# Patient Record
Sex: FEMALE | Race: BLACK OR AFRICAN AMERICAN
[De-identification: names, ages, dates, MRNs, and addresses within clinical notes are randomized per-mention and may not be internally consistent; named-entity substitution may affect disease eponyms.]

---

## 2021-03-01 ENCOUNTER — HOSPITAL ENCOUNTER (EMERGENCY)
Dept: HOSPITAL 47 - EC | Age: 21
Discharge: HOME | End: 2021-03-01
Payer: COMMERCIAL

## 2021-03-01 VITALS — HEART RATE: 64 BPM | RESPIRATION RATE: 18 BRPM | TEMPERATURE: 97.6 F

## 2021-03-01 DIAGNOSIS — O21.9: Primary | ICD-10-CM

## 2021-03-01 DIAGNOSIS — Z3A.00: ICD-10-CM

## 2021-03-01 LAB
PH UR: 7 [PH] (ref 5–8)
RBC UR QL: 2 /HPF (ref 0–5)
SP GR UR: 1.02 (ref 1–1.03)
SQUAMOUS UR QL AUTO: 9 /HPF (ref 0–4)
UROBILINOGEN UR QL STRIP: 6 MG/DL (ref ?–2)
WBC # UR AUTO: 3 /HPF (ref 0–5)

## 2021-03-01 PROCEDURE — 81001 URINALYSIS AUTO W/SCOPE: CPT

## 2021-03-01 PROCEDURE — 81025 URINE PREGNANCY TEST: CPT

## 2021-03-01 NOTE — ED
Recheck HPI





- General


Chief Complaint: Recheck/Abnormal Lab/Rx


Stated Complaint: Date check


Time Seen by Provider: 21 20:23


Source: patient


Mode of arrival: ambulatory


Limitations: no limitations





- History of Present Illness


Initial Comments: 


20-year-old female patient presents to the emergency department today requesting

confirmation of pregnancy.  Patient states that she took two pregnancy tests at 

home yesterday and wants to confirm that she is pregnant. Patient states that 

her last period was at the end of January. States it was a normal period. States

she has been having some nausea and did vomit some bile. She is A2 with 2 

elective abortions. She is requesting testing because her and her fiance have 

been together for 5 years and she never had a pregnancy with him. She is not on 

birth control and they do not use protection.  She denies any current abdominal 

pain, abnormal vaginal bleeding, or abnormal discharge.  Denies any genital 

itching.  Denies concern for sexually transmitted infections.  She denies any 

hematuria, dysuria, urinary frequency, urinary urgency.








- Related Data


                                  Previous Rx's











 Medication  Instructions  Recorded


 


Pnv No.95/Ferrous Fum/Folic AC 1 each PO DAILY #30 tablet 21





[Prenatal Multivitamin Tablet]  











                                    Allergies











Allergy/AdvReac Type Severity Reaction Status Date / Time


 


No Known Allergies Allergy   Verified 21 20:19














Review of Systems


ROS Statement: 


Those systems with pertinent positive or pertinent negative responses have been 

documented in the HPI.





ROS Other: All systems not noted in ROS Statement are negative.





Past Medical History


Past Medical History: No Reported History


History of Any Multi-Drug Resistant Organisms: None Reported


Past Surgical History: No Surgical Hx Reported


Past Psychological History: No Psychological Hx Reported


Smoking Status: Never smoker


Past Alcohol Use History: None Reported


Past Drug Use History: None Reported





General Exam


Limitations: no limitations


General appearance: alert, in no apparent distress, other (This is a well-

developed, well-nourished adult female patient in no acute distress.  Vital 

signs upon presentation are temperature 97.6F, pulse 64, respirations 18, pulse

ox 99% on room air.)


Respiratory exam: Present: normal lung sounds bilaterally.  Absent: respiratory 

distress, wheezes, rales, rhonchi, stridor


Cardiovascular Exam: Present: regular rate, normal rhythm, normal heart sounds. 

Absent: systolic murmur, diastolic murmur, rubs, gallop, clicks


GI/Abdominal exam: Present: soft, normal bowel sounds.  Absent: distended, 

tenderness, guarding, rebound, rigid


Neurological exam: Present: alert, oriented X3, CN II-XII intact


Psychiatric exam: Present: normal affect, normal mood


Skin exam: Present: warm, dry, intact, normal color.  Absent: rash





Course


                                   Vital Signs











  21





  20:15


 


Temperature 97.6 F


 


Pulse Rate 64


 


Respiratory 18





Rate 


 


O2 Sat by Pulse 99





Oximetry 














Medical Decision Making





- Medical Decision Making


20-year-old female patient presents to the emergency department today requesting

confirmation of pregnancy.  She is having no abdominal pain, vaginal bleeding, 

or discharge rotation has had some intermittent nausea and has vomited bile.  

Last menstrual period was the end of January.  Physical examination is 

unremarkable.  Urinalysis and hCG was obtained.  HCG testing was positive.  No 

evidence for urinary tract infection.  Did discuss results with the patient.  

We'll start prenatal vitamin.  Based on her last menstrual.  She is approximat

ely 4 weeks along.  She is instructed to follow-up with her primary care 

physician and instructed to follow-up with an OB/GYN for recheck.  She is given 

information for the [facility] pregnancy clinic.  Return parameters were discussed in

detail.  She verbalizes understanding and agrees with this plan. My attending is

Dr. Elizalde.





- Lab Data


                                   Lab Results











  21 Range/Units





  20:30 20:30 


 


Urine Color  Yellow   


 


Urine Appearance  Cloudy H   (Clear)  


 


Urine pH  7.0   (5.0-8.0)  


 


Ur Specific Gravity  1.019   (1.001-1.035)  


 


Urine Protein  Negative   (Negative)  


 


Urine Glucose (UA)  Negative   (Negative)  


 


Urine Ketones  Negative   (Negative)  


 


Urine Blood  Negative   (Negative)  


 


Urine Nitrite  Negative   (Negative)  


 


Urine Bilirubin  Negative   (Negative)  


 


Urine Urobilinogen  6.0   (<2.0)  mg/dL


 


Ur Leukocyte Esterase  Moderate H   (Negative)  


 


Urine RBC  2   (0-5)  /hpf


 


Urine WBC  3   (0-5)  /hpf


 


Ur Squamous Epith Cells  9 H   (0-4)  /hpf


 


Amorphous Sediment  Rare H   (None)  /hpf


 


Urine Mucus  Rare H   (None)  /hpf


 


Urine HCG, Qual   Detected  (Not Detectd)  














Disposition


Clinical Impression: 


 Early stage of pregnancy





Disposition: HOME SELF-CARE


Condition: Good


Instructions (If sedation given, give patient instructions):  Pregnancy (ED)


Additional Instructions: 


Start prenatal vitamins.  You can follow-up with the [facility] for early 

prenatal care and ultrasound phone number: [phone]. Services are free and 

confidential. Return to the emergency department for any new, worsening, or 

concerning symptoms.


Prescriptions: 


Pnv No.95/Ferrous Fum/Folic AC [Prenatal Multivitamin Tablet] 1 each PO DAILY 

#30 tablet


Is patient prescribed a controlled substance at d/c from ED?: No


Referrals: 


Angy Bueno DO [Doctor of Osteopathic Medicine] - 1-2 days


Time of Disposition: 20:43

## 2022-03-14 ENCOUNTER — HOSPITAL ENCOUNTER (EMERGENCY)
Dept: HOSPITAL 47 - EC | Age: 22
Discharge: HOME | End: 2022-03-14
Payer: COMMERCIAL

## 2022-03-14 VITALS
HEART RATE: 71 BPM | DIASTOLIC BLOOD PRESSURE: 88 MMHG | TEMPERATURE: 98.7 F | SYSTOLIC BLOOD PRESSURE: 134 MMHG | RESPIRATION RATE: 16 BRPM

## 2022-03-14 DIAGNOSIS — L02.31: Primary | ICD-10-CM

## 2022-03-14 PROCEDURE — 99282 EMERGENCY DEPT VISIT SF MDM: CPT

## 2022-03-14 NOTE — ED
Skin/Abscess/FB HPI





- General


Chief complaint: Skin/Abscess/Foreign Body


Stated complaint: Female 


Time Seen by Provider: 03/14/22 20:54


Source: patient, RN notes reviewed


Mode of arrival: ambulatory


Limitations: no limitations





- History of Present Illness


Initial comments: 





This is a pleasant 21-year-old female comes here complaining of a small draining

abscess on her buttock.  Patient states she's had it before.  Patient states it 

started draining.  There is some pain.  Os.  Denies any problems with bowel 

movements or urination.  No other skin manifestations or lesions.  No history of

MRSA.  No history of immunosuppression.





No headache, no fever or chills, no changes in vision or hearing, no sore throat

or difficulty with speech, no neck pain, no chest pain or shortness of breath, 

no abdominal pain, no nausea or vomiting, no changes in urination or bowel 

movements, no numbness or tingling, no extremity pain, no skin rashes or 

lesions.





- Related Data


                                  Previous Rx's











 Medication  Instructions  Recorded


 


Pnv No.95/Ferrous Fum/Folic AC 1 each PO DAILY #30 tablet 03/01/21





[Prenatal Multivitamin Tablet]  


 


Acetaminophen [Tylenol] 500 mg PO Q4-6H PRN #24 tab 03/14/22


 


Naproxen [Naprosyn] 375 mg PO Q12HR PRN #20 tablet 03/14/22


 


clindamycin HCL [Cleocin] 300 mg PO Q6HR #40 cap 03/14/22











                                    Allergies











Allergy/AdvReac Type Severity Reaction Status Date / Time


 


No Known Allergies Allergy   Verified 03/14/22 18:31














Review of Systems


ROS Statement: 


Those systems with pertinent positive or pertinent negative responses have been 

documented in the HPI.





ROS Other: All systems not noted in ROS Statement are negative.





Past Medical History


Past Medical History: No Reported History


History of Any Multi-Drug Resistant Organisms: None Reported


Past Surgical History: No Surgical Hx Reported


Past Psychological History: No Psychological Hx Reported


Smoking Status: Never smoker


Past Alcohol Use History: None Reported


Past Drug Use History: None Reported





General Exam


Limitations: no limitations


General appearance: alert, in no apparent distress


Head exam: Present: atraumatic, normocephalic, normal inspection


Eye exam: Present: normal appearance, PERRL, EOMI.  Absent: scleral icterus, 

conjunctival injection, periorbital swelling


ENT exam: Present: normal exam, mucous membranes moist


Neck exam: Present: normal inspection.  Absent: tenderness, meningismus, 

lymphadenopathy


Respiratory exam: Present: normal lung sounds bilaterally.  Absent: respiratory 

distress, wheezes, rales, rhonchi, stridor


Cardiovascular Exam: Present: regular rate, normal rhythm, normal heart sounds. 

 Absent: systolic murmur, diastolic murmur, rubs, gallop, clicks


GI/Abdominal exam: Present: soft, normal bowel sounds.  Absent: distended, 

tenderness, guarding, rebound, rigid


Rectal exam: Present: normal rectal tone, other (Patient has a small draining 

abscess to the left buttock area.  No current medication with the rectal wall.  

No secondary cellulitis.)


Extremities exam: Present: normal inspection, full ROM, normal capillary refill.

  Absent: tenderness, pedal edema, joint swelling, calf tenderness


Back exam: Present: normal inspection


Neurological exam: Present: alert, oriented X3, CN II-XII intact


Psychiatric exam: Present: normal affect, normal mood


Skin exam: Present: warm, dry, intact, normal color.  Absent: rash





Course





                                   Vital Signs











  03/14/22





  18:32


 


Temperature 98.7 F


 


Pulse Rate 71


 


Respiratory 16





Rate 


 


Blood Pressure 134/88


 


O2 Sat by Pulse 99





Oximetry 














Medical Decision Making





- Medical Decision Making





Patient presents to symptomology consistent with a tiny, draining abscess 

approximately 1 cm to left buttock.  Minimal purulent drainage.  No secondary 

cellulitis.  Patient looks well otherwise.  Vital signs stable, patient 

afebrile.





Treatment with clindamycin 300 mg 4 times a day, warm compresses.  She will plan

 discussed with the patient.  All questions answered.





Patient was told to return to the ER for any signs or symptoms worsen.  Told to 

return immediately if any other problems arise.  All questions answered.  

Treatment plan discussed.  Patient in agreement


Every effort has been made to ensure accuracy of this dictation.  However, due 

to the limitations of electronic medical records and dictation devices, errors 

in charting still occur.





Disposition


Clinical Impression: 


 Abscess of buttock





Disposition: HOME SELF-CARE


Condition: Stable


Instructions (If sedation given, give patient instructions):  Abscess (ED)


Additional Instructions: 


Use warm compresses with a clean washcloth, soap, and water for 10-15 minutes at

 a time.  Due to this 4 times daily.  Alternatively, you can use and Epsom salt 

bath.  Take antibiotics as directed.





Return to the ER immediately if any symptoms worsen, new symptoms arise, or any 

other problems develop.


Is patient prescribed a controlled substance at d/c from ED?: No


Referrals: 


None,Stated [Primary Care Provider] - 1-2 days


Time of Disposition: 21:09